# Patient Record
Sex: MALE | Race: BLACK OR AFRICAN AMERICAN | Employment: OTHER | URBAN - METROPOLITAN AREA
[De-identification: names, ages, dates, MRNs, and addresses within clinical notes are randomized per-mention and may not be internally consistent; named-entity substitution may affect disease eponyms.]

---

## 2024-04-06 ENCOUNTER — APPOINTMENT (OUTPATIENT)
Dept: GENERAL RADIOLOGY | Age: 69
End: 2024-04-06
Payer: MEDICARE

## 2024-04-06 ENCOUNTER — HOSPITAL ENCOUNTER (EMERGENCY)
Age: 69
Discharge: ANOTHER HOSPITAL | End: 2024-04-06
Attending: EMERGENCY MEDICINE
Payer: MEDICARE

## 2024-04-06 ENCOUNTER — HOSPITAL ENCOUNTER (EMERGENCY)
Age: 69
Discharge: HOME | End: 2024-04-06
Attending: EMERGENCY MEDICINE
Payer: MEDICARE

## 2024-04-06 VITALS
HEIGHT: 68 IN | OXYGEN SATURATION: 96 % | WEIGHT: 208 LBS | BODY MASS INDEX: 31.52 KG/M2 | SYSTOLIC BLOOD PRESSURE: 130 MMHG | TEMPERATURE: 97.8 F | DIASTOLIC BLOOD PRESSURE: 96 MMHG | HEART RATE: 76 BPM | RESPIRATION RATE: 18 BRPM

## 2024-04-06 VITALS
HEART RATE: 90 BPM | OXYGEN SATURATION: 97 % | TEMPERATURE: 98.4 F | SYSTOLIC BLOOD PRESSURE: 148 MMHG | DIASTOLIC BLOOD PRESSURE: 109 MMHG | RESPIRATION RATE: 20 BRPM

## 2024-04-06 DIAGNOSIS — S68.119A TRAUMATIC AMPUTATION OF FINGER, INITIAL ENCOUNTER: Primary | ICD-10-CM

## 2024-04-06 LAB
ANION GAP SERPL CALCULATED.3IONS-SCNC: 10 MMOL/L (ref 9–17)
BASOPHILS # BLD: 0.04 K/UL (ref 0–0.2)
BASOPHILS NFR BLD: 1 % (ref 0–2)
BUN SERPL-MCNC: 18 MG/DL (ref 8–23)
BUN/CREAT SERPL: 20 (ref 9–20)
CALCIUM SERPL-MCNC: 8.8 MG/DL (ref 8.6–10.4)
CHLORIDE SERPL-SCNC: 105 MMOL/L (ref 98–107)
CO2 SERPL-SCNC: 22 MMOL/L (ref 20–31)
CREAT SERPL-MCNC: 0.9 MG/DL (ref 0.7–1.2)
EOSINOPHIL # BLD: 0.07 K/UL (ref 0–0.44)
EOSINOPHILS RELATIVE PERCENT: 1 % (ref 1–4)
ERYTHROCYTE [DISTWIDTH] IN BLOOD BY AUTOMATED COUNT: 13.1 % (ref 11.8–14.4)
GFR SERPL CREATININE-BSD FRML MDRD: >90 ML/MIN/1.73M2
GLUCOSE SERPL-MCNC: 92 MG/DL (ref 70–99)
HCT VFR BLD AUTO: 50.7 % (ref 40.7–50.3)
HGB BLD-MCNC: 16.9 G/DL (ref 13–17)
IMM GRANULOCYTES # BLD AUTO: 0 K/UL (ref 0–0.3)
IMM GRANULOCYTES NFR BLD: 0 %
LYMPHOCYTES NFR BLD: 2.55 K/UL (ref 1.1–3.7)
LYMPHOCYTES RELATIVE PERCENT: 51 % (ref 24–43)
MCH RBC QN AUTO: 29.9 PG (ref 25.2–33.5)
MCHC RBC AUTO-ENTMCNC: 33.3 G/DL (ref 28.4–34.8)
MCV RBC AUTO: 89.7 FL (ref 82.6–102.9)
MONOCYTES NFR BLD: 0.42 K/UL (ref 0.1–1.2)
MONOCYTES NFR BLD: 9 % (ref 3–12)
NEUTROPHILS NFR BLD: 38 % (ref 36–65)
NEUTS SEG NFR BLD: 1.87 K/UL (ref 1.5–8.1)
NRBC BLD-RTO: 0 PER 100 WBC
PLATELET # BLD AUTO: 232 K/UL (ref 138–453)
PMV BLD AUTO: 8.9 FL (ref 8.1–13.5)
POTASSIUM SERPL-SCNC: 4.1 MMOL/L (ref 3.7–5.3)
RBC # BLD AUTO: 5.65 M/UL (ref 4.21–5.77)
SODIUM SERPL-SCNC: 137 MMOL/L (ref 135–144)
WBC OTHER # BLD: 5 K/UL (ref 3.5–11.3)

## 2024-04-06 PROCEDURE — 6370000000 HC RX 637 (ALT 250 FOR IP): Performed by: STUDENT IN AN ORGANIZED HEALTH CARE EDUCATION/TRAINING PROGRAM

## 2024-04-06 PROCEDURE — 90471 IMMUNIZATION ADMIN: CPT | Performed by: EMERGENCY MEDICINE

## 2024-04-06 PROCEDURE — 90715 TDAP VACCINE 7 YRS/> IM: CPT | Performed by: EMERGENCY MEDICINE

## 2024-04-06 PROCEDURE — 96374 THER/PROPH/DIAG INJ IV PUSH: CPT

## 2024-04-06 PROCEDURE — 80048 BASIC METABOLIC PNL TOTAL CA: CPT

## 2024-04-06 PROCEDURE — 99284 EMERGENCY DEPT VISIT MOD MDM: CPT

## 2024-04-06 PROCEDURE — 73130 X-RAY EXAM OF HAND: CPT

## 2024-04-06 PROCEDURE — 6360000002 HC RX W HCPCS: Performed by: STUDENT IN AN ORGANIZED HEALTH CARE EDUCATION/TRAINING PROGRAM

## 2024-04-06 PROCEDURE — 99285 EMERGENCY DEPT VISIT HI MDM: CPT

## 2024-04-06 PROCEDURE — 85025 COMPLETE CBC W/AUTO DIFF WBC: CPT

## 2024-04-06 PROCEDURE — 96375 TX/PRO/DX INJ NEW DRUG ADDON: CPT

## 2024-04-06 PROCEDURE — 6360000002 HC RX W HCPCS: Performed by: EMERGENCY MEDICINE

## 2024-04-06 PROCEDURE — 96376 TX/PRO/DX INJ SAME DRUG ADON: CPT

## 2024-04-06 PROCEDURE — 73120 X-RAY EXAM OF HAND: CPT

## 2024-04-06 RX ORDER — ACETAMINOPHEN 500 MG
1000 TABLET ORAL EVERY 8 HOURS PRN
Qty: 21 TABLET | Refills: 0 | Status: SHIPPED | OUTPATIENT
Start: 2024-04-06 | End: 2024-04-13

## 2024-04-06 RX ORDER — HYDROMORPHONE HYDROCHLORIDE 1 MG/ML
1 INJECTION, SOLUTION INTRAMUSCULAR; INTRAVENOUS; SUBCUTANEOUS ONCE
Status: COMPLETED | OUTPATIENT
Start: 2024-04-06 | End: 2024-04-06

## 2024-04-06 RX ORDER — AMOXICILLIN AND CLAVULANATE POTASSIUM 875; 125 MG/1; MG/1
1 TABLET, FILM COATED ORAL ONCE
Status: COMPLETED | OUTPATIENT
Start: 2024-04-06 | End: 2024-04-06

## 2024-04-06 RX ORDER — AMOXICILLIN AND CLAVULANATE POTASSIUM 875; 125 MG/1; MG/1
1 TABLET, FILM COATED ORAL 2 TIMES DAILY
Qty: 14 TABLET | Refills: 0 | Status: SHIPPED | OUTPATIENT
Start: 2024-04-06 | End: 2024-04-13

## 2024-04-06 RX ORDER — FENTANYL CITRATE 50 UG/ML
50 INJECTION, SOLUTION INTRAMUSCULAR; INTRAVENOUS ONCE
Status: COMPLETED | OUTPATIENT
Start: 2024-04-06 | End: 2024-04-06

## 2024-04-06 RX ORDER — OXYCODONE HYDROCHLORIDE AND ACETAMINOPHEN 5; 325 MG/1; MG/1
1 TABLET ORAL ONCE
Status: COMPLETED | OUTPATIENT
Start: 2024-04-06 | End: 2024-04-06

## 2024-04-06 RX ORDER — MORPHINE SULFATE 4 MG/ML
4 INJECTION, SOLUTION INTRAMUSCULAR; INTRAVENOUS ONCE
Status: COMPLETED | OUTPATIENT
Start: 2024-04-06 | End: 2024-04-06

## 2024-04-06 RX ORDER — IBUPROFEN 400 MG/1
400 TABLET ORAL EVERY 6 HOURS PRN
Qty: 28 TABLET | Refills: 0 | Status: SHIPPED | OUTPATIENT
Start: 2024-04-06 | End: 2024-04-13

## 2024-04-06 RX ORDER — OXYCODONE HYDROCHLORIDE 5 MG/1
5 TABLET ORAL EVERY 8 HOURS PRN
Qty: 6 TABLET | Refills: 0 | Status: SHIPPED | OUTPATIENT
Start: 2024-04-06 | End: 2024-04-09

## 2024-04-06 RX ADMIN — OXYCODONE HYDROCHLORIDE AND ACETAMINOPHEN 1 TABLET: 5; 325 TABLET ORAL at 22:39

## 2024-04-06 RX ADMIN — Medication 2000 MG: at 19:16

## 2024-04-06 RX ADMIN — HYDROMORPHONE HYDROCHLORIDE 1 MG: 1 INJECTION, SOLUTION INTRAMUSCULAR; INTRAVENOUS; SUBCUTANEOUS at 17:44

## 2024-04-06 RX ADMIN — MORPHINE SULFATE 4 MG: 4 INJECTION, SOLUTION INTRAMUSCULAR; INTRAVENOUS at 16:02

## 2024-04-06 RX ADMIN — FENTANYL CITRATE 50 MCG: 50 INJECTION INTRAMUSCULAR; INTRAVENOUS at 19:15

## 2024-04-06 RX ADMIN — MORPHINE SULFATE 4 MG: 4 INJECTION, SOLUTION INTRAMUSCULAR; INTRAVENOUS at 16:33

## 2024-04-06 RX ADMIN — TETANUS TOXOID, REDUCED DIPHTHERIA TOXOID AND ACELLULAR PERTUSSIS VACCINE, ADSORBED 0.5 ML: 5; 2.5; 8; 8; 2.5 SUSPENSION INTRAMUSCULAR at 17:44

## 2024-04-06 RX ADMIN — AMOXICILLIN AND CLAVULANATE POTASSIUM 1 TABLET: 875; 125 TABLET, FILM COATED ORAL at 22:04

## 2024-04-06 ASSESSMENT — PAIN SCALES - GENERAL
PAINLEVEL_OUTOF10: 0
PAINLEVEL_OUTOF10: 10
PAINLEVEL_OUTOF10: 0
PAINLEVEL_OUTOF10: 10

## 2024-04-06 ASSESSMENT — PAIN DESCRIPTION - LOCATION
LOCATION: FINGER (COMMENT WHICH ONE)

## 2024-04-06 ASSESSMENT — PAIN DESCRIPTION - DESCRIPTORS: DESCRIPTORS: SHARP

## 2024-04-06 ASSESSMENT — ENCOUNTER SYMPTOMS
DIARRHEA: 0
SHORTNESS OF BREATH: 0
RHINORRHEA: 0
VOMITING: 0
EYE DISCHARGE: 0
SORE THROAT: 0
COLOR CHANGE: 0
EYE REDNESS: 0
COUGH: 0

## 2024-04-06 ASSESSMENT — PAIN DESCRIPTION - ORIENTATION
ORIENTATION: LEFT

## 2024-04-06 ASSESSMENT — PAIN - FUNCTIONAL ASSESSMENT: PAIN_FUNCTIONAL_ASSESSMENT: NONE - DENIES PAIN

## 2024-04-06 NOTE — ED NOTES
Cirilo Lincoln  68 yom with finger crush injury.    PIP injury.    Dr Curtis consult on the case recommended transfer to [facility] for orthopedic evaluation.   Patient coming ambulance versus private auto if time for ambulance excessive.      Accepted by Dr. Hwang

## 2024-04-06 NOTE — ED PROVIDER NOTES
[facility] ED  Emergency Department Encounter  Emergency Medicine Resident     Pt Name:Cirilo Lincoln  MRN: [id number]  Birthdate 1955  Date of evaluation: 4/6/24  PCP:  Jessy Cruz MD  Note Started: 6:32 PM EDT      CHIEF COMPLAINT       Chief Complaint   Patient presents with    Finger Injury       HISTORY OF PRESENT ILLNESS  (Location/Symptom, Timing/Onset, Context/Setting, Quality, Duration, Modifying Factors, Severity.)      Cirilo Lincoln is a 68 y.o. male who presents with transfer from outside facility for traumatic amputation of left middle finger.  Patient was moving a very large 6000 pound trailer when it fell and crushed his middle finger.  He is left-handed.  No blood thinner use.  No numbness or weakness he had tetanus updated there.  No other injuries    PAST MEDICAL / SURGICAL / SOCIAL / FAMILY HISTORY      has no past medical history on file.     has no past surgical history on file.    Social History     Socioeconomic History    Marital status:      Spouse name: Not on file    Number of children: Not on file    Years of education: Not on file    Highest education level: Not on file   Occupational History    Not on file   Tobacco Use    Smoking status: Not on file    Smokeless tobacco: Not on file   Substance and Sexual Activity    Alcohol use: Not on file    Drug use: Not on file    Sexual activity: Not on file   Other Topics Concern    Not on file   Social History Narrative    Not on file     Social Determinants of Health     Financial Resource Strain: Not on file   Food Insecurity: Not on file   Transportation Needs: Not on file   Physical Activity: Not on file   Stress: Not on file   Social Connections: Not on file   Intimate Partner Violence: Not on file   Housing Stability: Not on file       No family history on file.    Allergies:  Patient has no known allergies.    Home Medications:  Prior to Admission medications    Medication Sig Start Date End Date Taking?  Authorizing Provider   amoxicillin-clavulanate (AUGMENTIN) 875-125 MG per tablet Take 1 tablet by mouth 2 times daily for 7 days 4/6/24 4/13/24 Yes Duc Juarez DO   acetaminophen (TYLENOL) 500 MG tablet Take 2 tablets by mouth every 8 hours as needed for Pain 4/6/24 4/13/24 Yes Duc Juarez DO   oxyCODONE (ROXICODONE) 5 MG immediate release tablet Take 1 tablet by mouth every 8 hours as needed for Pain for up to 6 doses. Intended supply: 3 days. Take lowest dose possible to manage pain Max Daily Amount: 15 mg 4/6/24 4/9/24 Yes Duc Juarez DO   ibuprofen (IBU) 400 MG tablet Take 1 tablet by mouth every 6 hours as needed for Pain 4/6/24 4/13/24 Yes Duc Juarez DO       REVIEW OF SYSTEMS       Review of Systems   Skin:  Positive for wound.   All other systems reviewed and are negative.      PHYSICAL EXAM      INITIAL VITALS:   BP (!) 130/96   Pulse 76   Temp 97.8 °F (36.6 °C)   Resp 18   Ht 1.727 m (5' 8\")   Wt 94.3 kg (208 lb)   SpO2 96%   BMI 31.63 kg/m²     Physical Exam  Cardiovascular:      Rate and Rhythm: Normal rate.      Comments: +2 equal palpable upper extremity pulses  Musculoskeletal:      Comments: The left third phalanx is partially amputated at the PIP joint there is bone and soft tissue exposed.   Neurological:      Comments: Strength and sensation intact upper extremities bilateral           DDX/DIAGNOSTIC RESULTS / EMERGENCY DEPARTMENT COURSE / MDM     Medical Decision Making  Here with partially potation of middle left finger did have sensation of the distal tip therefore hand surgery recommended approximation of the tissue splinting and following up outpatient.  We did give Ancef and washed the wound out patient discharged antibiotics pain control return to follow precautions    Amount and/or Complexity of Data Reviewed  Radiology:  Decision-making details documented in ED Course.  Discussion of management or test interpretation with external provider(s):

## 2024-04-06 NOTE — ED PROVIDER NOTES
[facility] ED     Emergency Department     Faculty Attestation        I performed a history and physical examination of the patient and discussed management with the resident. I reviewed the resident’s note and agree with the documented findings and plan of care. Any areas of disagreement are noted on the chart. I was personally present for the key portions of any procedures. I have documented in the chart those procedures where I was not present during the key portions. I have reviewed the emergency nurses triage note. I agree with the chief complaint, past medical history, past surgical history, allergies, medications, social and family history as documented unless otherwise noted below.  For Physician Assistant/ Nurse Practitioner cases/documentation I have personally evaluated this patient and have completed at least one if not all key elements of the E/M (history, physical exam, and MDM). Additional findings are as noted.      PCP:  Jessy Cruz MD  Note Started: 4/6/24, 6:40 PM EDT    Pertinent Comments:     Patient 68-year-old male who suffered a crush injury to his left hand distal ring finger with traumatic amputation mostly at the PIP joint.   Case was discussed at previous hospital with Dr. Lock.   He recommended transfer to [facility] however he states the patient does not wish to do this he will see him here at [facility] with the orthopedic residents.       Critical Care  None      (Please note that portions of this note were completed with a voice recognition program. Efforts were made to edit the dictations but occasionally words are mis-transcribed. Whenever words are used in this note in any gender, they shall be construed as though they were used in the gender appropriate to the circumstances; and whenever words are used in this note in the singular or plural form, they shall be construed as though they were used in  the form appropriate to the circumstances.)    Sabino Hwang MD [facility]  Attending Emergency Medicine Physician           Sabino Hwang MD  04/06/24 [id number]

## 2024-04-06 NOTE — ED TRIAGE NOTES
Pt arrives to ED rm 23 via private auto as a transfer from [facility]. Pt sustained a crush injury to the finger early this evening around 3:30. Pt arrives in no acute distress. Pt crushed finger in a trailer hitch where only a small portion of skin is holding the finger on. Pt receieved morphine dilaudid and tetanus at [facility] prior to coming here. Pt vitals are stable and patient is resting in bed with personal items and call light within reach. Will continue with plan of care.

## 2024-04-06 NOTE — ED PROVIDER NOTES
EMERGENCY DEPARTMENT ENCOUNTER    Pt Name: Cirilo Lincoln  MRN: [id number]  Birthdate 1955  Date of evaluation: 4/6/24  CHIEF COMPLAINT       Chief Complaint   Patient presents with    Finger Injury     HISTORY OF PRESENT ILLNESS   68-year-old male presents with complaints of a traumatic injury to the left middle finger, he was working on a food truck, the alexey failed and a heavy metal object slammed on his finger.  Tetanus is up-to-date.           REVIEW OF SYSTEMS     Review of Systems   Constitutional:  Negative for chills and fever.   HENT:  Negative for rhinorrhea and sore throat.    Eyes:  Negative for discharge, redness and visual disturbance.   Respiratory:  Negative for cough and shortness of breath.    Cardiovascular:  Negative for chest pain, palpitations and leg swelling.   Gastrointestinal:  Negative for diarrhea, nausea and vomiting.   Genitourinary:  Negative for dysuria and hematuria.   Musculoskeletal:  Negative for arthralgias, myalgias and neck pain.        Finger injury   Skin:  Negative for color change and rash.   Neurological:  Negative for seizures, weakness and headaches.   Psychiatric/Behavioral:  Negative for hallucinations, self-injury and suicidal ideas.      PASTMEDICAL HISTORY   No past medical history on file.  Past Problem List  There is no problem list on file for this patient.    SURGICAL HISTORY     No past surgical history on file.  CURRENT MEDICATIONS       Previous Medications    No medications on file     ALLERGIES     has No Known Allergies.  FAMILY HISTORY     has no family status information on file.      SOCIAL HISTORY        PHYSICAL EXAM     INITIAL VITALS: BP (!) 133/102   Pulse 92   Temp 98.4 °F (36.9 °C) (Oral)   Resp 20   SpO2 98%    Physical Exam  Constitutional:       Appearance: Normal appearance.   HENT:      Head: Normocephalic and atraumatic.   Eyes:      Extraocular Movements: Extraocular movements intact.      Pupils: Pupils are equal, round, and

## 2024-04-07 NOTE — DISCHARGE INSTRUCTIONS
Maintain the splint on your finger at all times.  Take antibiotics as prescribed to prevent infection.  Follow-up with hand surgery as soon as possible.  Use Tylenol and Motrin for pain control you may also use Roxicodone do not drive or operate heavy machinery as this can make you drowsy.  If you have any increasing pain swelling fevers or chills return to the emergency department.        Orthopaedic Instructions:  -Weight bearing status: Non weight bearing with the left arm  -Do not remove dressings or splint until your post-operative follow up date. It is important that you do not get your splint wet. To avoid this and still maintain proper hygiene, you can wrap a garbage/plastic bag (or similar waterproof material) about the splinted arm/leg and secure it with tape while showering. One should still attempt to keep splint out of water with this method. If your splint were to fall off, it is important that you do not attempt to put it back on. Instead, return to the orthopaedic clinic for reapplication (see number below to call).  -Always look for signs of compartment syndrome: pain out of proportion to the injury, pain not controlled with pain medication, numbness in digits, changing of color of digits (paleness). If these signs occur return to ED immediately for reassessment.  -Ice (20 minutes on and off 1 hour) and elevate to reduce swelling and throbbing pain.  -Call the office or come to Emergency Room if signs of infection appear (hot, swollen, red, draining pus, fever)  -Take medications as prescribed.  -Wean off narcotics (percocet/norco) as soon as possible. Do not take tylenol if still taking narcotics.  -Follow up with Dr. Curtis in his office on Tuesday 4/9/24. Call [phone] to schedule/confirm or with any questions/concerns.    
Orbital.../Triceps.../Fat overlying ribcage.../Buccal...

## 2024-04-07 NOTE — PROGRESS NOTES
Pharmacy Note  Aminoglycoside Consult    Cirilo Lincoln is a 68 y.o. male ordered gentamicin for open fracture osteomyelitis prophylaxis; consult received from Dr. Cramer to manage therapy.    There is no problem list on file for this patient.      Allergies:  Patient has no known allergies.     Temp max: 97.8    Recent Labs     04/06/24  1615   CREATININE 0.9       Recent Labs     04/06/24  1555   WBC 5.0       No intake or output data in the 24 hours ending 04/06/24 2156    Culture Date      Source                 Results  N/a    Height:   Ht Readings from Last 1 Encounters:   04/06/24 1.727 m (5' 8\")     Weight:  Wt Readings from Last 1 Encounters:   04/06/24 94.3 kg (208 lb)     Estimated Creatinine Clearance: 85 mL/min (based on SCr of 0.9 mg/dL).  Estimated CrCl using Ideal Body Weight: 68 mL/min (based on IBW 73.8 kg)    Assessment/Plan:  Gentamicin 5 mg/kg IVPB (ideal body weight) every 24 hours. Continue for 72 hours or 24 hours post wound closure.    Thank you for the consult.  Will continue to follow.

## 2024-04-07 NOTE — CONSULTS
Plastic Surgery Consult  (Dr. Curtis)    Time Evaluated: 7:15 pm    CC/Reason for consult:  Traumatic partial amputation left middle finger    HPI:      The patient is a 68 y.o. left hand-dominant male who presented to the [facility] emergency department as a transfer from [place].  Patient suffered a traumatic amputation of the left middle finger at the level of the PIP joint earlier today.  Patient states that he was attempting to attach a trailer to his truck when the trailer dropped landing directly on his left middle finger.  Patient states that he immediately had pain in the finger, however he was able to press a button and lift the trailer off of his finger.  Patient first attempted to go to a different hospital, and was told that they were too busy to see him.  Patient then went to [place], and then was transferred to [facility] for a higher level of care.  On arrival, plastic/hand surgery was consulted for the traumatic amputation of the left middle finger.    On evaluation, patient confirms mechanism of injury.  Patient did receive a metacarpal block from the emergency department so his pain is well-controlled at this time.  On evaluation, patient does state that he still has sensation at the distal aspect of the left middle finger. Patient denies any injury to the additional fingers on the left hand.  Patient denies any numbness or tingling additional fingers and left hand. Patient denies any prior plastic/orthopedic surgery history.  The patient does work catering.  Patient did injure his dominant hand.      Past Medical History:    No past medical history on file.  Past Surgical History:    No past surgical history on file.  Medications Prior to Admission:   Prior to Admission medications    Medication Sig Start Date End Date Taking? Authorizing Provider   amoxicillin-clavulanate (AUGMENTIN) 875-125 MG per tablet Take 1 tablet by mouth 2 times daily for 7 days 4/6/24 4/13/24 Yes Larry  devascularized.  Bony crepitus is felt on palpation of the left middle finger.  Patient is able to flex the middle finger at the MCP joint.  Post repair with sutures patient is able to flex the middle finger at the PIP joint.  AIN/PIN/median/radial/ulnar motor complexes intact in remaining digits.  Sensation intact to light touch C6-C8 and remaining digits.  Radial pulses 2+.  Extremity is warm and well-perfused with BCR.      Labs:  Recent Labs     04/06/24  1555 04/06/24  1615   WBC 5.0  --    HGB 16.9  --    HCT 50.7*  --      --    NA  --  137   K  --  4.1   BUN  --  18   CREATININE  --  0.9   GLUCOSE  --  92        Imaging:   Multiple views of the left hand in a skeletally mature individual.  Initial imaging of the left hand does demonstrate the left middle finger with a traumatic amputation distal to the PIP.  Distal to the PIP the middle phalanx is comminuted and the middle phalanx and distal phalanx are displaced volar to the proximal phalanx.  Post splint films show improved alignment of the digits, and the base of the left middle phalanx of the middle finger is comminuted.  Negative for additional acute fractures or dislocations.    Assessment: 68 y.o. male being seen for:    -Traumatic partial amputation of the left middle finger at the PIP joint.     Plan:    - No acute orthopaedic surgical intervention planned at this time  -In-depth discussion with the patient regarding current clinical status.  Patient was informed that as he has maintained sensation to the distal aspect of the left middle finger, there is a chance that he does have some viable blood supply to the digit, and we will have to wait and see if the distal aspect of the digit remains viable.  Patient is amenable to following up in the hand clinic on Tuesday for reevaluation.  Patient was informed that at some point it is likely that he will require an operation, however at this time we will evaluate to see if a revision amputation

## 2024-04-10 ENCOUNTER — ANESTHESIA EVENT (OUTPATIENT)
Dept: OPERATING ROOM | Age: 69
End: 2024-04-10
Payer: MEDICARE

## 2024-04-10 PROBLEM — E78.00 HIGH CHOLESTEROL: Status: ACTIVE | Noted: 2024-04-10

## 2024-04-10 PROBLEM — K42.9 UMBILICAL HERNIA WITHOUT OBSTRUCTION AND WITHOUT GANGRENE: Status: ACTIVE | Noted: 2023-09-24

## 2024-04-10 PROBLEM — I10 HIGH BLOOD PRESSURE: Status: ACTIVE | Noted: 2024-04-10

## 2024-04-10 PROBLEM — E11.9 DIABETES (HCC): Status: ACTIVE | Noted: 2024-04-10

## 2024-04-11 ENCOUNTER — HOSPITAL ENCOUNTER (OUTPATIENT)
Age: 69
Setting detail: OUTPATIENT SURGERY
Discharge: HOME | End: 2024-04-11
Attending: PLASTIC SURGERY | Admitting: PLASTIC SURGERY
Payer: MEDICARE

## 2024-04-11 ENCOUNTER — ANESTHESIA (OUTPATIENT)
Dept: OPERATING ROOM | Age: 69
End: 2024-04-11
Payer: MEDICARE

## 2024-04-11 VITALS
RESPIRATION RATE: 13 BRPM | TEMPERATURE: 96.8 F | SYSTOLIC BLOOD PRESSURE: 134 MMHG | DIASTOLIC BLOOD PRESSURE: 88 MMHG | HEART RATE: 85 BPM | OXYGEN SATURATION: 98 %

## 2024-04-11 DIAGNOSIS — S68.613A: ICD-10-CM

## 2024-04-11 PROCEDURE — 7100000010 HC PHASE II RECOVERY - FIRST 15 MIN: Performed by: PLASTIC SURGERY

## 2024-04-11 PROCEDURE — 3700000001 HC ADD 15 MINUTES (ANESTHESIA): Performed by: PLASTIC SURGERY

## 2024-04-11 PROCEDURE — 2709999900 HC NON-CHARGEABLE SUPPLY: Performed by: PLASTIC SURGERY

## 2024-04-11 PROCEDURE — 7100000011 HC PHASE II RECOVERY - ADDTL 15 MIN: Performed by: PLASTIC SURGERY

## 2024-04-11 PROCEDURE — 3600000012 HC SURGERY LEVEL 2 ADDTL 15MIN: Performed by: PLASTIC SURGERY

## 2024-04-11 PROCEDURE — 88300 SURGICAL PATH GROSS: CPT

## 2024-04-11 PROCEDURE — 2580000003 HC RX 258

## 2024-04-11 PROCEDURE — 7100000001 HC PACU RECOVERY - ADDTL 15 MIN: Performed by: PLASTIC SURGERY

## 2024-04-11 PROCEDURE — 3700000000 HC ANESTHESIA ATTENDED CARE: Performed by: PLASTIC SURGERY

## 2024-04-11 PROCEDURE — 2500000003 HC RX 250 WO HCPCS

## 2024-04-11 PROCEDURE — 7100000000 HC PACU RECOVERY - FIRST 15 MIN: Performed by: PLASTIC SURGERY

## 2024-04-11 PROCEDURE — 3600000002 HC SURGERY LEVEL 2 BASE: Performed by: PLASTIC SURGERY

## 2024-04-11 PROCEDURE — 6360000002 HC RX W HCPCS

## 2024-04-11 PROCEDURE — 2500000003 HC RX 250 WO HCPCS: Performed by: PLASTIC SURGERY

## 2024-04-11 RX ORDER — GLYCOPYRROLATE 1 MG/5 ML
SYRINGE (ML) INTRAVENOUS PRN
Status: DISCONTINUED | OUTPATIENT
Start: 2024-04-11 | End: 2024-04-11 | Stop reason: SDUPTHER

## 2024-04-11 RX ORDER — HYDRALAZINE HYDROCHLORIDE 20 MG/ML
10 INJECTION INTRAMUSCULAR; INTRAVENOUS
Status: CANCELLED | OUTPATIENT
Start: 2024-04-11

## 2024-04-11 RX ORDER — MEPERIDINE HYDROCHLORIDE 50 MG/ML
12.5 INJECTION INTRAMUSCULAR; INTRAVENOUS; SUBCUTANEOUS EVERY 5 MIN PRN
Status: CANCELLED | OUTPATIENT
Start: 2024-04-11

## 2024-04-11 RX ORDER — MORPHINE SULFATE 2 MG/ML
2 INJECTION, SOLUTION INTRAMUSCULAR; INTRAVENOUS EVERY 5 MIN PRN
Status: CANCELLED | OUTPATIENT
Start: 2024-04-11

## 2024-04-11 RX ORDER — OXYCODONE HYDROCHLORIDE AND ACETAMINOPHEN 5; 325 MG/1; MG/1
1 TABLET ORAL EVERY 6 HOURS PRN
Qty: 28 TABLET | Refills: 0 | Status: SHIPPED | OUTPATIENT
Start: 2024-04-11 | End: 2024-04-18

## 2024-04-11 RX ORDER — IPRATROPIUM BROMIDE AND ALBUTEROL SULFATE 2.5; .5 MG/3ML; MG/3ML
1 SOLUTION RESPIRATORY (INHALATION)
Status: CANCELLED | OUTPATIENT
Start: 2024-04-11 | End: 2024-04-12

## 2024-04-11 RX ORDER — SODIUM CHLORIDE 0.9 % (FLUSH) 0.9 %
5-40 SYRINGE (ML) INJECTION EVERY 12 HOURS SCHEDULED
Status: DISCONTINUED | OUTPATIENT
Start: 2024-04-11 | End: 2024-04-11 | Stop reason: HOSPADM

## 2024-04-11 RX ORDER — GLYCOPYRROLATE 0.2 MG/ML
0.4 INJECTION INTRAMUSCULAR; INTRAVENOUS ONCE
Status: CANCELLED | OUTPATIENT
Start: 2024-04-11 | End: 2024-04-11

## 2024-04-11 RX ORDER — OXYCODONE HYDROCHLORIDE AND ACETAMINOPHEN 5; 325 MG/1; MG/1
1 TABLET ORAL
Status: CANCELLED | OUTPATIENT
Start: 2024-04-11 | End: 2024-04-12

## 2024-04-11 RX ORDER — SODIUM CHLORIDE 9 MG/ML
INJECTION, SOLUTION INTRAVENOUS CONTINUOUS
Status: DISCONTINUED | OUTPATIENT
Start: 2024-04-11 | End: 2024-04-11 | Stop reason: HOSPADM

## 2024-04-11 RX ORDER — ONDANSETRON 2 MG/ML
INJECTION INTRAMUSCULAR; INTRAVENOUS PRN
Status: DISCONTINUED | OUTPATIENT
Start: 2024-04-11 | End: 2024-04-11 | Stop reason: SDUPTHER

## 2024-04-11 RX ORDER — PHENYLEPHRINE HCL IN 0.9% NACL 1 MG/10 ML
SYRINGE (ML) INTRAVENOUS PRN
Status: DISCONTINUED | OUTPATIENT
Start: 2024-04-11 | End: 2024-04-11 | Stop reason: SDUPTHER

## 2024-04-11 RX ORDER — LIDOCAINE HYDROCHLORIDE 10 MG/ML
INJECTION, SOLUTION INFILTRATION; PERINEURAL PRN
Status: DISCONTINUED | OUTPATIENT
Start: 2024-04-11 | End: 2024-04-11 | Stop reason: SDUPTHER

## 2024-04-11 RX ORDER — SODIUM CHLORIDE 9 MG/ML
INJECTION, SOLUTION INTRAVENOUS PRN
Status: CANCELLED | OUTPATIENT
Start: 2024-04-11

## 2024-04-11 RX ORDER — CEFAZOLIN 2 G/1
INJECTION, POWDER, FOR SOLUTION INTRAMUSCULAR; INTRAVENOUS
Status: COMPLETED
Start: 2024-04-11 | End: 2024-04-11

## 2024-04-11 RX ORDER — LABETALOL HYDROCHLORIDE 5 MG/ML
10 INJECTION, SOLUTION INTRAVENOUS
Status: CANCELLED | OUTPATIENT
Start: 2024-04-11

## 2024-04-11 RX ORDER — NALOXONE HYDROCHLORIDE 0.4 MG/ML
INJECTION, SOLUTION INTRAMUSCULAR; INTRAVENOUS; SUBCUTANEOUS PRN
Status: CANCELLED | OUTPATIENT
Start: 2024-04-11

## 2024-04-11 RX ORDER — PROPOFOL 10 MG/ML
INJECTION, EMULSION INTRAVENOUS PRN
Status: DISCONTINUED | OUTPATIENT
Start: 2024-04-11 | End: 2024-04-11 | Stop reason: SDUPTHER

## 2024-04-11 RX ORDER — BUPIVACAINE HYDROCHLORIDE AND EPINEPHRINE 5; 5 MG/ML; UG/ML
INJECTION, SOLUTION EPIDURAL; INTRACAUDAL; PERINEURAL PRN
Status: DISCONTINUED | OUTPATIENT
Start: 2024-04-11 | End: 2024-04-11 | Stop reason: ALTCHOICE

## 2024-04-11 RX ORDER — ONDANSETRON 2 MG/ML
4 INJECTION INTRAMUSCULAR; INTRAVENOUS
Status: CANCELLED | OUTPATIENT
Start: 2024-04-11 | End: 2024-04-12

## 2024-04-11 RX ORDER — FENTANYL CITRATE 50 UG/ML
INJECTION, SOLUTION INTRAMUSCULAR; INTRAVENOUS PRN
Status: DISCONTINUED | OUTPATIENT
Start: 2024-04-11 | End: 2024-04-11 | Stop reason: SDUPTHER

## 2024-04-11 RX ORDER — MIDAZOLAM HYDROCHLORIDE 2 MG/2ML
2 INJECTION, SOLUTION INTRAMUSCULAR; INTRAVENOUS
Status: CANCELLED | OUTPATIENT
Start: 2024-04-11 | End: 2024-04-12

## 2024-04-11 RX ORDER — SODIUM CHLORIDE 0.9 % (FLUSH) 0.9 %
5-40 SYRINGE (ML) INJECTION EVERY 12 HOURS SCHEDULED
Status: CANCELLED | OUTPATIENT
Start: 2024-04-11

## 2024-04-11 RX ORDER — SODIUM CHLORIDE 0.9 % (FLUSH) 0.9 %
5-40 SYRINGE (ML) INJECTION PRN
Status: DISCONTINUED | OUTPATIENT
Start: 2024-04-11 | End: 2024-04-11 | Stop reason: HOSPADM

## 2024-04-11 RX ORDER — CEFAZOLIN SODIUM 1 G/3ML
INJECTION, POWDER, FOR SOLUTION INTRAMUSCULAR; INTRAVENOUS PRN
Status: DISCONTINUED | OUTPATIENT
Start: 2024-04-11 | End: 2024-04-11 | Stop reason: SDUPTHER

## 2024-04-11 RX ORDER — SODIUM CHLORIDE, SODIUM LACTATE, POTASSIUM CHLORIDE, CALCIUM CHLORIDE 600; 310; 30; 20 MG/100ML; MG/100ML; MG/100ML; MG/100ML
INJECTION, SOLUTION INTRAVENOUS CONTINUOUS PRN
Status: DISCONTINUED | OUTPATIENT
Start: 2024-04-11 | End: 2024-04-11 | Stop reason: SDUPTHER

## 2024-04-11 RX ORDER — OXYCODONE HYDROCHLORIDE AND ACETAMINOPHEN 5; 325 MG/1; MG/1
2 TABLET ORAL
Status: CANCELLED | OUTPATIENT
Start: 2024-04-11 | End: 2024-04-12

## 2024-04-11 RX ORDER — SODIUM CHLORIDE 0.9 % (FLUSH) 0.9 %
5-40 SYRINGE (ML) INJECTION PRN
Status: CANCELLED | OUTPATIENT
Start: 2024-04-11

## 2024-04-11 RX ORDER — SODIUM CHLORIDE, SODIUM LACTATE, POTASSIUM CHLORIDE, CALCIUM CHLORIDE 600; 310; 30; 20 MG/100ML; MG/100ML; MG/100ML; MG/100ML
INJECTION, SOLUTION INTRAVENOUS CONTINUOUS
Status: DISCONTINUED | OUTPATIENT
Start: 2024-04-11 | End: 2024-04-11 | Stop reason: HOSPADM

## 2024-04-11 RX ORDER — METOCLOPRAMIDE HYDROCHLORIDE 5 MG/ML
10 INJECTION INTRAMUSCULAR; INTRAVENOUS
Status: CANCELLED | OUTPATIENT
Start: 2024-04-11 | End: 2024-04-12

## 2024-04-11 RX ORDER — DIPHENHYDRAMINE HYDROCHLORIDE 50 MG/ML
12.5 INJECTION INTRAMUSCULAR; INTRAVENOUS
Status: CANCELLED | OUTPATIENT
Start: 2024-04-11 | End: 2024-04-12

## 2024-04-11 RX ORDER — DEXAMETHASONE SODIUM PHOSPHATE 10 MG/ML
INJECTION INTRAMUSCULAR; INTRAVENOUS PRN
Status: DISCONTINUED | OUTPATIENT
Start: 2024-04-11 | End: 2024-04-11 | Stop reason: SDUPTHER

## 2024-04-11 RX ORDER — AMOXICILLIN AND CLAVULANATE POTASSIUM 875; 125 MG/1; MG/1
1 TABLET, FILM COATED ORAL 2 TIMES DAILY
Qty: 20 TABLET | Refills: 0 | Status: SHIPPED | OUTPATIENT
Start: 2024-04-11 | End: 2024-04-21

## 2024-04-11 RX ORDER — SODIUM CHLORIDE 9 MG/ML
INJECTION, SOLUTION INTRAVENOUS PRN
Status: DISCONTINUED | OUTPATIENT
Start: 2024-04-11 | End: 2024-04-11 | Stop reason: HOSPADM

## 2024-04-11 RX ADMIN — Medication 200 MCG: at 14:49

## 2024-04-11 RX ADMIN — LIDOCAINE HYDROCHLORIDE 40 MG: 10 INJECTION, SOLUTION INFILTRATION; PERINEURAL at 14:22

## 2024-04-11 RX ADMIN — Medication 200 MCG: at 14:45

## 2024-04-11 RX ADMIN — FENTANYL CITRATE 25 MCG: 50 INJECTION, SOLUTION INTRAMUSCULAR; INTRAVENOUS at 14:16

## 2024-04-11 RX ADMIN — FENTANYL CITRATE 25 MCG: 50 INJECTION, SOLUTION INTRAMUSCULAR; INTRAVENOUS at 14:22

## 2024-04-11 RX ADMIN — PROPOFOL 200 MG: 10 INJECTION, EMULSION INTRAVENOUS at 14:22

## 2024-04-11 RX ADMIN — Medication 200 MCG: at 14:33

## 2024-04-11 RX ADMIN — Medication 200 MCG: at 14:38

## 2024-04-11 RX ADMIN — ONDANSETRON 4 MG: 2 INJECTION INTRAMUSCULAR; INTRAVENOUS at 14:57

## 2024-04-11 RX ADMIN — Medication 200 MCG: at 14:51

## 2024-04-11 RX ADMIN — Medication 0.2 MG: at 14:22

## 2024-04-11 RX ADMIN — SODIUM CHLORIDE, POTASSIUM CHLORIDE, SODIUM LACTATE AND CALCIUM CHLORIDE: 600; 310; 30; 20 INJECTION, SOLUTION INTRAVENOUS at 14:17

## 2024-04-11 RX ADMIN — DEXAMETHASONE SODIUM PHOSPHATE 10 MG: 10 INJECTION INTRAMUSCULAR; INTRAVENOUS at 14:39

## 2024-04-11 RX ADMIN — CEFAZOLIN 2 G: 1 INJECTION, POWDER, FOR SOLUTION INTRAMUSCULAR; INTRAVENOUS at 14:28

## 2024-04-11 ASSESSMENT — PAIN - FUNCTIONAL ASSESSMENT
PAIN_FUNCTIONAL_ASSESSMENT: NONE - DENIES PAIN
PAIN_FUNCTIONAL_ASSESSMENT: 0-10

## 2024-04-11 NOTE — ANESTHESIA POSTPROCEDURE EVALUATION
Department of Anesthesiology  Postprocedure Note    Patient: Cirilo Lincoln  MRN: [id number]  YOB: 1955  Date of evaluation: 4/11/2024    Procedure Summary       Date: 04/11/24 Room / Location: [address]    Anesthesia Start: 1417 Anesthesia Stop: 1518    Procedure: LEFT MIDDLE FINGER REVISION AMPUTATION WITH FLAP CLOSURE (Left: Middle Finger) Diagnosis:       Complete traumatic amputation of left middle finger through phalanx, initial encounter      (Complete traumatic amputation of left middle finger through phalanx, initial encounter [S68.613A])    Surgeons: Gurwinder Curtis MD Responsible Provider: Malik North MD    Anesthesia Type: general ASA Status: 2            Anesthesia Type: No value filed.    Amanda Phase I: Amanda Score: 8    Amanda Phase II:      Anesthesia Post Evaluation    Patient location during evaluation: PACU  Patient participation: complete - patient participated  Level of consciousness: awake and alert  Airway patency: patent  Nausea & Vomiting: no nausea and no vomiting  Cardiovascular status: hemodynamically stable  Respiratory status: room air and spontaneous ventilation  Hydration status: euvolemic  Multimodal analgesia pain management approach  Pain management: adequate    No notable events documented.

## 2024-04-11 NOTE — OP NOTE
Operative Note      Patient: Cirilo Lincoln  YOB: 1955  MRN: [id number]    Date of Procedure: 4/11/2024    Pre-Op Diagnosis Codes:     * Complete traumatic amputation of left middle finger through phalanx, initial encounter [S68.613A]    Post-Op Diagnosis: Same       Procedure(s):  LEFT MIDDLE FINGER REVISION AMPUTATION WITH FLAP CLOSURE    Surgeon(s):  Gurwinder Curtis MD    Assistant:   * No surgical staff found *    Anesthesia: General    Estimated Blood Loss (mL): less than 50     Complications: None    Specimens:   ID Type Source Tests Collected by Time Destination   A : necrotic left  middle finger - for gross only Tissue Tissue SURGICAL PATHOLOGY Gurwinder Curtis MD 4/11/2024 1439        Implants:  * No implants in log *      Drains: * No LDAs found *    Findings:  Infection Present At Time Of Surgery (PATOS) (choose all levels that have infection present):  No infection present  Other Findings: Nonviable distal Middle finger Left hand At the junction of the Middle and distal one third of the proximal phalanx.  Detailed Description of Procedure:   With the patient supine and general anesthesia induced via posterior pharyngeal LMA intubation a distal metacarpal digital block with half percent Marcaine 1-200,000 epinephrine was performed.  After sterile prep and drape and appropriate timeout, the hand was completely evaluated.  All sutures were removed.  There is no viability to the distal tissue.  The stripping of tissue occurred at approximately the junction of the middle and distal third of the proximal phalanx of the left middle finger.  It was elected to strip the bone back to the junction of the proximal and middle one third of the proximal phalanx.  At this point the bone was transected with a bone biter.  The proximal bone was rounded with a rongeur and smoothed.  The remainder of the distal portion of the finger was removed.  The edges were sharp and.  The flap design was  designed from proximal ulnar diagonally across to distal radial to allow a pinch pad for comfort.  This was started closure on the volar side along the ulnar proximal border.  I W-plasty like excision of the remaining excess skin was performed and the small areas were interdigitated for closure.  A large VY flap was performed dorsally and radially to contour the edge of the oblique injury.  Excellent inset of the flap with 3-0 Prolene was noted.  Prior to closure copious irrigation had been carried out.  There is no sign of any purulence.  Adaptic and sterile wrap was placed.  He tolerated this well went to recovery in excellent condition.  The defect measured approximately 6 cm² and the VY flap with W-plasty allow for approximately 5 cm² of closure with the remaining being made up with local tissue advancement.    Electronically signed by Gurwinder Curtis MD on 4/11/2024 at 3:44 PM

## 2024-04-11 NOTE — H&P
Office Note     CASI Curtis MD, FACS     Subjective:      Patient ID: Cirilo Lincoln is a 68 y.o. male.     HPI  DOI on 04- LMF Amputation.  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      Review of Systems     Past Medical History   No past medical history on file.     Past Surgical History   No past surgical history on file.     No Known Allergies  Current Facility-Administered Medications          Current Outpatient Medications   Medication Sig Dispense Refill    amLODIPine (NORVASC) 5 MG tablet Take 1 tablet by mouth daily        aspirin 81 MG EC tablet Take 1 tablet by mouth daily        atorvastatin (LIPITOR) 10 MG tablet Take 1 tablet by mouth daily        carvedilol (COREG) 25 MG tablet Take 1 tablet by mouth 2 times daily (with meals)        Cholecalciferol 50 MCG (2000 UT) TABS Take by mouth daily        docusate sodium (COLACE) 100 MG capsule TAKE 1 CAPSULE BY MOUTH IN THE MORNING AND 1 CAPSULE BEFORE BEDTIME FOR 10 DAYS        JARDIANCE 25 MG tablet Take 1 tablet by mouth every morning        lisinopril (PRINIVIL;ZESTRIL) 40 MG tablet Take 1 tablet by mouth daily        semaglutide, 2 MG/DOSE, (OZEMPIC, 2 MG/DOSE,) 8 MG/3ML SOPN sc injection INJECT 2 MG SUBCUTANEOUSLY ONCE WEEKLY        amoxicillin-clavulanate (AUGMENTIN) 875-125 MG per tablet Take 1 tablet by mouth 2 times daily for 7 days 14 tablet 0    acetaminophen (TYLENOL) 500 MG tablet Take 2 tablets by mouth every 8 hours as needed for Pain 21 tablet 0    HYDROcodone-acetaminophen (NORCO) 5-325 MG per tablet Take 1 tablet by mouth every 6 hours as needed. Max Daily Amount: 4 tablets (Patient not taking: Reported on 4/10/2024)        ibuprofen (IBU) 400 MG tablet Take 1 tablet by mouth every 6 hours as needed for Pain (Patient not taking: Reported on 4/10/2024) 28 tablet 0      No current facility-administered medications for this visit.         Social History               Socioeconomic History    Marital status:

## 2024-04-11 NOTE — ANESTHESIA PRE PROCEDURE
Department of Anesthesiology  Preprocedure Note       Name:  Cirilo Lincoln   Age:  68 y.o.  :  1955                                          MRN:  [id number]         Date:  2024      Surgeon: Surgeon(s):  Gurwinder Curtis MD    Procedure: Procedure(s):  LEFT MIDDLE FINGER REVISION AMPUTATION WITH FLAP CLOSURE    Medications prior to admission:   Prior to Admission medications    Medication Sig Start Date End Date Taking? Authorizing Provider   amLODIPine (NORVASC) 5 MG tablet Take 1 tablet by mouth daily    Ehsan Vaughan MD   aspirin 81 MG EC tablet Take 1 tablet by mouth daily    Ehsan Vaughan MD   atorvastatin (LIPITOR) 10 MG tablet Take 1 tablet by mouth daily    Ehsan Vaughan MD   carvedilol (COREG) 25 MG tablet Take 1 tablet by mouth 2 times daily (with meals)    Ehsan Vaughan MD   Cholecalciferol 50 MCG (2000) TABS Take by mouth daily    Ehsan Vaughan MD   docusate sodium (COLACE) 100 MG capsule TAKE 1 CAPSULE BY MOUTH IN THE MORNING AND 1 CAPSULE BEFORE BEDTIME FOR 10 DAYS 24   Ehsan Vaughan MD   JARDIANCE 25 MG tablet Take 1 tablet by mouth every morning    Ehsan Vaughan MD   HYDROcodone-acetaminophen (NORCO) 5-325 MG per tablet Take 1 tablet by mouth every 6 hours as needed. Max Daily Amount: 4 tablets  Patient not taking: Reported on 4/10/2024 1/18/24   Ehsan Vaughan MD   lisinopril (PRINIVIL;ZESTRIL) 40 MG tablet Take 1 tablet by mouth daily    Ehsan Vaughan MD   semaglutide, 2 MG/DOSE, (OZEMPIC, 2 MG/DOSE,) 8 MG/3ML SOPN sc injection INJECT 2 MG SUBCUTANEOUSLY ONCE WEEKLY 23   Ehsan Vaughan MD   amoxicillin-clavulanate (AUGMENTIN) 875-125 MG per tablet Take 1 tablet by mouth 2 times daily for 7 days 24  Duc Juarez DO   acetaminophen (TYLENOL) 500 MG tablet Take 2 tablets by mouth every 8 hours as needed for Pain 24  Duc Juarez DO   ibuprofen (IBU) 400 MG

## 2024-04-15 LAB — SURGICAL PATHOLOGY REPORT: NORMAL

## (undated) DEVICE — DRESSING PETRO W3XL3IN OIL EMUL N ADH GZ KNIT IMPREG CELOS

## (undated) DEVICE — PREMIUM DRY TRAY LF: Brand: MEDLINE INDUSTRIES, INC.

## (undated) DEVICE — BANDAGE GZ W2XL75IN ST RAYON POLY CNFRM STRTCH LTWT

## (undated) DEVICE — SUTURE PROL SZ 3-0 L18IN NONABSORBABLE BLU L24MM FS-1 3/8 8684G

## (undated) DEVICE — GLOVE ORANGE PI 7 1/2   MSG9075

## (undated) DEVICE — PENCIL ES L3M BTTN SWCH HOLSTER W/ BLDE ELECTRD EDGE

## (undated) DEVICE — DRAPE,REIN 53X77,STERILE: Brand: MEDLINE

## (undated) DEVICE — SOLUTION IRRIG 1000ML 0.9% SOD CHL USP POUR PLAS BTL

## (undated) DEVICE — MHPB HAND AND FOOT PACK: Brand: MEDLINE INDUSTRIES, INC.

## (undated) DEVICE — SOLUTION SCRB 4OZ 4% CHG H2O AIDED FOR PREOPERATIVE SKIN

## (undated) DEVICE — NEEDLE HYPO 25GA L1.5IN BLU POLYPR HUB S STL REG BVL STR

## (undated) DEVICE — ELECTRODE PT RET AD L9FT HI MOIST COND ADH HYDRGEL CORDED